# Patient Record
Sex: FEMALE | Race: WHITE | NOT HISPANIC OR LATINO
[De-identification: names, ages, dates, MRNs, and addresses within clinical notes are randomized per-mention and may not be internally consistent; named-entity substitution may affect disease eponyms.]

---

## 2022-01-31 ENCOUNTER — DASHBOARD ENCOUNTERS (OUTPATIENT)
Age: 27
End: 2022-01-31

## 2022-02-25 ENCOUNTER — OFFICE VISIT (OUTPATIENT)
Dept: RURAL CLINIC 8 | Facility: CLINIC | Age: 27
End: 2022-02-25

## 2022-02-25 NOTE — HPI-TODAY'S VISIT:
Pt comes on referral from Lorene Casarez. a copy of the consultation will be sent to the referring provider. [place] was [place] [facility] [place] 2021 with epigastric pain. CT showed pancreatitis.